# Patient Record
Sex: MALE | Race: WHITE | NOT HISPANIC OR LATINO | URBAN - METROPOLITAN AREA
[De-identification: names, ages, dates, MRNs, and addresses within clinical notes are randomized per-mention and may not be internally consistent; named-entity substitution may affect disease eponyms.]

---

## 2019-06-04 ENCOUNTER — CONVERSION ENCOUNTER (OUTPATIENT)
Dept: OTHER | Facility: HOSPITAL | Age: 34
End: 2019-06-04

## 2019-06-05 VITALS
OXYGEN SATURATION: 100 % | SYSTOLIC BLOOD PRESSURE: 140 MMHG | WEIGHT: 249 LBS | HEIGHT: 74 IN | DIASTOLIC BLOOD PRESSURE: 94 MMHG | BODY MASS INDEX: 31.95 KG/M2 | HEART RATE: 101 BPM | RESPIRATION RATE: 18 BRPM

## 2019-06-06 NOTE — PROGRESS NOTES
Visit Type:  Acute Visit  Referring Provider:  ISABEL Mcneill    CC:   and left ear/runny nose/dizzy.    History of Present Illness:  Left ear started hurting yesterday morning.  A little dizzy, cough, congested, achy.  No drainage.  Muffled hearing. No history of cerumen impactions. No n/v/d. No fever/chills.      Vital Signs:    Patient Profile:    34 Years Old Male  Height:     74 inches  Weight:     249 pounds  BMI:        31.97     O2 Sat:     100 %  Temp:       98.8 degrees F oral  Pulse rate: 101 / minute  Resp:       18 per minute  BP Sittin / 94  (right arm)    Cuff size:  large  Patient has a risk of falls? No  Patient in pain?    Yes     Location:    left ear     Intensity:   8     Type:        aching    Problems: Active problems were reviewed with the patient during this visit.  Medications: Medications were reviewed with the patient during this visit.  Allergies: Allergies were reviewed with the patient during this visit.  No Known Allergy.  No Known Drug Allergy.        Vitals Entered By: Sadia Fraire RN (2019 2:09 PM)    Active Medications (reviewed today):  STAHIST AD 25-60 MG ORAL TABLET (CHLORCYCLIZINE-PSEUDOEPHED) one tablet by mouth three times a day  PROMETHAZINE HCL 25 MG ORAL TABLET (PROMETHAZINE HCL) take one every 4 hours as needed for nausea and vomiting  MEDROL 4 MG ORAL TABLET THERAPY PACK (METHYLPREDNISOLONE) use as directed  HYDROCODONE-ACETAMINOPHEN 5-325 MG ORAL TABLET (HYDROCODONE-ACETAMINOPHEN) 1 po q 8 hours prn pain  CYCLOBENZAPRINE HCL 10 MG ORAL TABLET (CYCLOBENZAPRINE HCL) 1 po tid prn  IMITREX 100 MG ORAL TABLET (SUMATRIPTAN SUCCINATE) 1 po q d prn headache  PAMELOR 75 MG ORAL CAPSULE (NORTRIPTYLINE HCL) 1 po q hs  CELEXA 40 MG ORAL TABLET (CITALOPRAM HYDROBROMIDE) 1 po q d  LEVSIN/SL 0.125 MG SUBLINGUAL TABLET SUBLINGUAL (HYOSCYAMINE SULFATE) 1-2 sublingual prn abdominal pain  COLACE 100 MG ORAL CAPSULE (DOCUSATE SODIUM) take 1 capsule by mouth once a  day  METAMUCIL POWD (PSYLLIUM POWD) take daily  FLONASE 50 MCG/ACT NASAL SUSPENSION (FLUTICASONE PROPIONATE) 2 puffs each nostril daily  KLONOPIN 1 MG ORAL TABLET (CLONAZEPAM) 1 po bid prn  OMEPRAZOLE 40 MG ORAL CAPSULE DELAYED RELEASE (OMEPRAZOLE) 1 po bid    Current Allergies (reviewed today):  No known allergies    Current Medications (including medications started today):   AMOXICILLIN 875 MG ORAL TABLET (AMOXICILLIN) 1 po bid  PRILOSEC OTC 20 MG ORAL TABLET DELAYED RELEASE (OMEPRAZOLE MAGNESIUM)       Past Medical History:     Reviewed history from 05/29/2014 and no changes required:        Anxiety Disorder        G E R D        IBS        Migraines        Neck pain    Past Surgical History:     Reviewed history from 04/07/2014 and no changes required:        Unremarkable    Family History Summary:      Reviewed history Last on 11/17/2014 and no changes required:06/04/2019  Mother - Has Family History Unknown - Entered On: 4/10/2018    General Comments - FH:  Family History Unknown      Social History:     Reviewed history from 05/07/2014 and no changes required:        Patient has never smoked.        Passive Smoke: N        Alcohol Use: Y        Drug Use: N        HIV/High Risk: N        Regular Exercise: N        Hx Domestic Abuse: N        Mandaen Affecting Care: N            Social History Summary:  Patient has never smoked.  Passive Smoke: N  Alcohol Use: Y  Drug Use: N  HIV/High Risk: N  Regular Exercise: N  Hx Domestic Abuse: N  Mandaen Affecting Care: N  Social History Reviewed: 06/04/2019          Risk Factors:     Smoked Tobacco Use:  Never smoker  Smokeless Tobacco Use:  Current     Tobacco Use Comments:  Vapor smoker  Passive smoke exposure:  no  Drug use:  no  Alcohol use:  yes  Seatbelt use:  100 %      Review of Systems   General: Complains of headache, fatigue. Denies fevers, chills.   Ears/Nose/Throat: Complains of earache, decreased hearing, nasal congestion, sore throat. Denies ear  discharge.   Respiratory: Complains of cough. Denies dyspnea, excessive sputum, wheezing.   Gastrointestinal: Denies nausea, vomiting, diarrhea.         Physical Exam    General:      well developed, well nourished, in no acute distress.    Head:      normocephalic and atraumatic.    Eyes:      PERRL/EOM intact, conjunctiva and sclera clear with out nystagmus.    Ears:      L TM red and L TM bulging.  R TM normal  Nose:      turbinates swollen.    Mouth:      no deformity or lesions with good dentition.  post nasal drip.    Neck:      cervical adenopathy  Lungs:      clear bilaterally to auscultation.    Heart:      non-displaced PMI, chest non-tender; regular rate and rhythm, S1, S2 without murmurs, rubs, or gallops  Skin:      intact without lesions or rashes.    Psych:      alert and cooperative; normal mood and affect; normal attention span and concentration.        Blood Pressure:  Today's BP: 140/94 mm Hg            Impression & Recommendations:    Problem # 1:  Acute left otitis media (ICD-382.9) (DHW73-Z67.92)  Assessment: New    His updated medication list for this problem includes:     Amoxicillin 875 Mg Oral Tablet (Amoxicillin) ..... 1 po bid      Medications Added to Medication List This Visit:  1)  Amoxicillin 875 Mg Oral Tablet (Amoxicillin) .... 1 po bid  2)  Prilosec Otc 20 Mg Oral Tablet Delayed Release (Omeprazole magnesium)      Patient Instructions:  1)  Your diagnosis of otitis media or ear infection is ususally assocated with a bacterial infection. The infection will be treated with antibiotics. Other treatments may include Tylenol or ibuprofen and warm compresses, lots of liquids and rest. If you   have any unusual symptoms you should report them to your family doctor or return to the Urgent Care Center.      ]          Laceration/ Wound   Last Tetanus: TDAP (Adacel) WV06.1 (09/03/2013 6:28:18 PM)    Objective     cm  Assessment:     Plan:   Rx:   AMOXICILLIN 875 MG ORAL TABLET 1 po bid,  PRILOSEC OTC 20 MG ORAL TABLET DELAYED RELEASE.          Electronically signed by Maci HALL on 06/04/2019 at 2:19 PM  ________________________________________________________________________       Disclaimer: Converted Note message may not contain all data elements that existed in the legacy source system. Please see [employer] [facility] for the original note details.